# Patient Record
(demographics unavailable — no encounter records)

---

## 2025-06-02 NOTE — INTERPRETER SERVICES
[Time Spent: ____ minutes] : Total time spent using  services: [unfilled] minutes. The patient's primary language is not English thus required  services. [Interpreters_IDNumber] : [id number] [Interpreters_FullName] : [employer]

## 2025-06-02 NOTE — HISTORY OF PRESENT ILLNESS
[FreeTextEntry1] : PE and follow up of recent ER visit [de-identified] : The pt is 19 yo F with a hx of ?abnl pap s/p cryotherapy in [place] who was recently hospitalized with RLQ pain. CT was equivocal for appendicitis but ultimately she was dx'd with UTI  and was discharged home with Bactrim. Pelvic/transvaginal US were nl.  Urine cx came back neg after discharge. Today the pt still has lower and mid abdominal pain and indigestion, burning on urination for 3 years, but symptoms are getting stronger in the past month.  She also has had burning in stomach for 4 years with eating, worse with acidic foods. Denied fever, N/V, blood in urine, vaginal discharge, urinary frequency, new sexual partner, wt loss, blood in stool.

## 2025-06-02 NOTE — ASSESSMENT
[FreeTextEntry1] : She is still having persistent RLQ pain, no sig tenderness on exam.  Normal CT abd/pelvis and pelvic US.  HM: Check routine labs, including FLP, Hep studies. Referred to gyn for pap smear.  ?UTI: repeat UA/urine cx  GERD: prescribed omeprazole 20mg  RTC in 3 wks for a repeat evaluation [Vaccines Reviewed] : Immunizations reviewed today. Please see immunization details in the vaccine log within the immunization flowsheet.

## 2025-06-02 NOTE — PHYSICAL EXAM
[No Acute Distress] : no acute distress [Well Nourished] : well nourished [Well Developed] : well developed [Well-Appearing] : well-appearing [Normal Sclera/Conjunctiva] : normal sclera/conjunctiva [PERRL] : pupils equal round and reactive to light [Normal Outer Ear/Nose] : the outer ears and nose were normal in appearance [No Lymphadenopathy] : no lymphadenopathy [Supple] : supple [Thyroid Normal, No Nodules] : the thyroid was normal and there were no nodules present [No Respiratory Distress] : no respiratory distress  [No Accessory Muscle Use] : no accessory muscle use [Clear to Auscultation] : lungs were clear to auscultation bilaterally [Normal Rate] : normal rate  [Regular Rhythm] : with a regular rhythm [Normal S1, S2] : normal S1 and S2 [No Murmur] : no murmur heard [No Varicosities] : no varicosities [No Edema] : there was no peripheral edema [Soft] : abdomen soft [Non Tender] : non-tender [Non-distended] : non-distended [Normal Posterior Cervical Nodes] : no posterior cervical lymphadenopathy [Normal Anterior Cervical Nodes] : no anterior cervical lymphadenopathy [No Joint Swelling] : no joint swelling [Grossly Normal Strength/Tone] : grossly normal strength/tone [No Rash] : no rash [Coordination Grossly Intact] : coordination grossly intact [No Focal Deficits] : no focal deficits [Normal Gait] : normal gait [Normal Affect] : the affect was normal [Normal Insight/Judgement] : insight and judgment were intact

## 2025-06-02 NOTE — HEALTH RISK ASSESSMENT
[Good] : ~his/her~ current health as good [No] : In the past 12 months have you used drugs other than those required for medical reasons? No [1] : 2) Feeling down, depressed, or hopeless for several days (1) [Never] : Never [Fair] :  ~his/her~ mood as fair [Intercurrent ED visits] : went to ED [Little interest or pleasure doing things] : 1) Little interest or pleasure doing things [Feeling down, depressed, or hopeless] : 2) Feeling down, depressed, or hopeless [[id number]] : 2

## 2025-06-04 NOTE — HISTORY OF PRESENT ILLNESS
[FreeTextEntry1] : keloid on ear, scar on the chest [de-identified] : NPA here for bulbous keloid scar on the R ear mid helix also notes a surgical scar on the L chest

## 2025-06-04 NOTE — PHYSICAL EXAM
[FreeTextEntry3] :  Gen:                        Well appearing, well nourished, NAD. Skin:                       Eccrine:                 Within normal limits      Face:                      Neck:                      Chest:                     Neuro:                     No focal deficits. Age appropriate. Psych:                     Appropriate affect.

## 2025-06-04 NOTE — ASSESSMENT
[FreeTextEntry1] : -- NPA here for bulbous keloid scar on the R ear mid helix also notes a surgical scar on the L chest  #Keloid scar - R ear mid helix -dx, nature mgmt options discussed. in lieu of ILK, pt would like to proceed with excision. r/b/a disc incl high risk recurrence and need for ILK post-excision to prevent this. pt verbalizes understadning. wishes to proceed with excision as well as post excision ILK  #Hypertrophic scar -surgical scar L chest hypertrophic, no c/w keloid. can consider ILK for this area as well. plan to start with R ear excision. referral to Dr. Haynes for R helix excision

## 2025-06-19 NOTE — PLAN
[TextEntry] : We will plan to perform the following procedures in the OR under MAC + local: 39411 excision of right mid-helical rim keloid 03331, [id number], [id number] intermediate/ complex repair of ear  Photo consents were signed and photos were taken.  referral to radiation oncology (Dr. Nagy) for evaluation/ possible radiotherapy following keloid excision  referral to orthopedics for evaluation of palpable, painful left clavicular hardware s/p ORIF, possible removal   I spent 45 minutes reviewing the patient's chart, labs, imaging, interviewing and examining patient, and discussing plan of care with the patient, PA team, and other providers, excluding separately billable procedures.

## 2025-06-19 NOTE — ASSESSMENT
[FreeTextEntry1] : 21 yo female presenting with right mid-helical keloid and left mid-clavicular hypertrophic scar; both painful, pruritic, and symptomatic  Based on the history and physical, I believe the right mid-helical keloid is symptomatic, and surgical excision + radiotherapy is medically necessary and indicated to relieve her symptoms and minimize recurrence.  The risks, benefits, alternatives (i.e. no surgery), were discussed as detailed in the [facility] consent, including (but not limiting to) healing issues, bleeding, infection, scarring (including hypertrophic scars/ keloids), firmness, change in skin sensation, skin contour irregularities, skin discoloration/ swelling, skin sensitivity, wound separation, palpable, visible sutures, delayed healing, damage to deeper structures, fat necrosis, seroma, pain, allergic reactions, drug reactions, asymmetry, persistent swelling (lymphedema), unsatisfactory results, systemic spread and/ or recurrence of skin lesion/ tumor, inaccuracy of frozen-section, risks associated with anesthesia, shock, cardiopulmonary complications and venous thrombosis and sequelae, and the patient would like to proceed.  In regards to the left clavicular hypertrophic scar, my recommendation is in agreement with that of Dr. Parra's (dermatology), i.e. treatment with interlesional Kenalog.

## 2025-06-19 NOTE — REASON FOR VISIT
[Initial Evaluation] : an initial evaluation [FreeTextEntry1] : painful right helical rim and left clavicular keloids

## 2025-06-19 NOTE — PHYSICAL EXAM
[NI] : Normal [de-identified] : 1.1cm nodular, hyperemic, firm skin lesion on right mid-helical region, tender on palpation; margin of lesion extending beyond the borders of original injury 6cm x 9mm hyperemic, raised, firm scar overlying left mid-clavicular region; margin of lesion does not extend beyond that of orignal injury + hyperesthesia, tender on light palpation underling fixation plate and screws easily palpable; tender on palpation

## 2025-06-19 NOTE — HISTORY OF PRESENT ILLNESS
[FreeTextEntry1] : This is a 20 year old Welsh speaking female who presents for concern about lesions on her right helical rim and left clavicular region.   The right helical rim soft tissue mass has been present for over 2.5 years. It appeared ~ 7 months after right helical rim infection s/o ear piercing. The lesion has been getting bigger, causing pain and itchiness.  The left clavicular region skin lesion has been present for >1.5 years. It first appeared ~5 months after her ORIF of the left clavicle that she suffered from soccer injury in [place]. The metal plate has been left in place but is becoming more and more palpable. The overlying skin lesion is also getting more raised, causing pain and itchiness.   She/ did not have lesions excised in the past.  There is no history of malignancy in the family.  She denies tobacco/ aspirin/ anticoagulant use. She works in a [employer], and she lives with her siblings. She presents for evaluation.

## 2025-06-23 NOTE — HISTORY OF PRESENT ILLNESS
[FreeTextEntry1] : FU [de-identified] : The pt is a 19 yo woman with no sig PMH here for fu Since our last visit, she was seen by gynecology and had a nl pelvic exam, STI panel, UA and cx. She is still having a lower abd pain, urinary frequency but they are not as severe as before.   Denied fever, dysuria. She was referred to urology by gynecology, has an appt next month.  She was also seen by derm and plastics for left chest surgical scar and right ear keloid. She'll proceed with the surgery to excise right helical keloid and radiotherapy if it's approved by insurance. ILK will be planned for the left chest scar.

## 2025-06-23 NOTE — ASSESSMENT
[FreeTextEntry1] :  Persistent lower abd pain/dysuria: normal pelvic exam and labs.  repeat UA,cx negative. Follow up with urology as scheduled.  right ear keloid/left chest scar: per plastics and derm  RTC in 1 year or earlier if needed

## 2025-07-11 NOTE — HISTORY OF PRESENT ILLNESS
[FreeTextEntry1] : Very pleasant 20-year-old woman who presents for evaluation of left flank pain, concern for recent urinary tract infection.  She recently went to the hospital complaining of left flank pain.  A CT scan was performed which demonstrated no kidney stones, hydronephrosis, nor renal masses.  A urine culture was negative.  She was prescribed antibiotics and reports that the pain completely resolved after completing a course of antibiotics.  She denies dysuria.  No hematuria at this time.  No flank pain or suprapubic pain.  No other complaints.

## 2025-07-11 NOTE — ASSESSMENT
[FreeTextEntry1] : Very pleasant 20-year-old woman who presents for evaluation of left flank pain, concern for recent UTI - We discussed potential etiologies of flank pain - CT images reviewed demonstrating no kidney stones, hydronephrosis, nor renal masses - Urine culture negative -Syphilis screen negative -HIV negative -GC/chlamydia negative - Check Ureaplasma/mycoplasma - Check Mycobacterium - Renal ultrasound - Patient completed a course of antibiotics  Patient is being seen today for evaluation and management of a chronic and longitudinal ongoing condition and I am the primary treating physician